# Patient Record
Sex: MALE | Race: BLACK OR AFRICAN AMERICAN | NOT HISPANIC OR LATINO | URBAN - METROPOLITAN AREA
[De-identification: names, ages, dates, MRNs, and addresses within clinical notes are randomized per-mention and may not be internally consistent; named-entity substitution may affect disease eponyms.]

---

## 2018-12-12 LAB — CRC RECOMMENDATION EXT: NORMAL

## 2019-01-21 ENCOUNTER — HISTORICAL (OUTPATIENT)
Dept: CARDIOLOGY | Facility: HOSPITAL | Age: 69
End: 2019-01-21

## 2022-06-08 PROBLEM — D75.9: Status: ACTIVE | Noted: 2022-06-08

## 2022-06-08 NOTE — PROGRESS NOTES
Subjective:       Patient ID: Reg Connell Jr. is a 71 y.o. male.    Chief Complaint: cytopenia (6 mo f/u, Labs, no concerns)      History of Present Illness  Referring: Dr. Garcia (now retired)  PCP: Dr. Jacobs  Vascular: Dr. Rivas  Nephrologist: Dr. Cazares  GI: Dr. Jewell    Problem List: Macrocytic Anemia     Current Treatment: B12 and Folic acid supplements (initiated 2016)    HPI/Clinical History: 71 year old male with mild persistent anemia. Dr. Garcia did a rather complete work up that revealed just a mild macrocytic anemia.   Differential includes b 12 or folate deficiency or very early bone marrow problem.  Bone Marrow Biopsy 11/2/2017- Mildly hypercellular marrow (30-40%) with erythroid hyperplasia and dyserythropoiesis. Normal flow and normal cytogenetics. Increased Iron stores.    Interval History   Today 6/9/22: Patient here today for follow-up visit. He feels well overall and is without complaints today. He is taking Vitamin B12 and folic acid supplements as directed per PCP. He denies any abnormal bleeding or bruising, melena, SOB, CP, early satiety, fevers, or recent illnesses. Labs reviewed and stable overall. No complaints or concerns reported.    Labs:  6/13/2017: WBC 9, Hgb 11.5, Plt 305K, , Hgb electrophoresis, Iron 136, ferritin 376, erythropoietin 23, retic ct 3, haptoglobin 195, SPEP: normal  10/17/17: WBC 7.9, Hgb 10.8, Plt 192K, MCV 99, B12 1403, Folate >20.0, TSH WNL, Creatinine 1.78, GFR 38  11/29/17 Testosterone level 463, Erythropoietin 23.3. WBC 8.0 HGB 12.1, MCV 99, PLT 289K  5/25/17 WBC 8.1 HGB 11.7, HCT 35.3, PLT 211K Cr 1.34  11/30/18 WBC 9.1 HGB 11.4, HCT 34.9, , Folate >20  5/31/19 WBC 8.7, Hgb 11.2, Hct 33.3, MCV 99, MCH 33, Plt 202K, Cr 1.55  2/14/20 WBC 7.6, Hgb 10.7, MCV 99, , Cr 1.60 AlkPhos 124, M spike not observed, QUIRINO normal, SLFC ratio 1.15, kappa 32, lambda 28, T4 1.31, testosterone 403, TSH 1.13, EPO 18, , ESR 24, copper 114, ferritin  288, retic 2.9%, CRP 4. Myeloid panel for MDS negative  20 WBC 8.5 Hg 10.9  Cr 2.18 Retic 75k  20 CR 1.80, TP 6.8, ALB 4.3, alk phos 118, WBC 8.0, Hg 10.5, , ANC 5.7, Retic 11K, Vit B12 875, cryoglobulin negative  21 Cr 1.73 WBC 7.9 RBC 3.07 Hg 10.2  RDW 12.6  ANC 5.4 Folate >20 B12 1354 Retic abs 110k Alb 4.4 TP 6.7 Ca 9.6 AlkPhos 99 AST 19 ALT 23  21 Cr 1.77, , Ferritin 172, Retic 3.1, WBC 8.1, hgb 10.4, , RDW 12.4  22 WBC 8.8, Hgb 10.3, , RDW 12.5, , ANC  6.5, , Retic 2.7%    Imagin/3/2014: Colonoscopy (Dr. Garcia) -polyps at 60 cm and rectum (3mm). Pathology: tubular adenoma  3/2018: Carotid US- 50% blockage---> started on ASA    Bone Marrow Biopsy  2017: Mildly hypercellular marrow (30-40%) with erythroid hyperplasia and dyserythropoiesis. Normal flow and normal cytogenetics. Increased Iron stores.    PMHx: macrocytic anemia, DM, HTN, CKD, B12 and folate deficiency, CAD s/p PCI  SocialHx: Past ETOH use, former smoker, no illicit drug use, benzene and petroleum products, hydrocarbon  FamilyHx: Father- cancer (unknown origin); Mother- heart failure  Allergies: NKDA        Review of Systems  CONSTITUTIONAL: no fevers, no chills, no weight loss, no fatigue, no weakness  HEMATOLOGIC: no abnormal bleeding, no abnormal bruising, no drenching night sweats  ONCOLOGIC: no new masses or lumps  HEENT: no vision loss, no tinnitus or hearing loss, no nose bleeding, no dysphagia, no odynophagia  CVS: no chest pain, no palpitations, no dyspnea on exertion  RESP: no shortness of breath, no hemoptysis, no cough  GI: no nausea, no vomiting, no diarrhea, no constipation, no melena, no hematochezia, no hematemesis, no abdominal pain, no increase in abdominal girth  : no dysuria, no hematuria, no hesitancy, no scrotal swelling, no discharge  INTEGUMENT: no rashes, no abnormal bruising, no nail pitting, no hyperpigmentation  NEURO:  no falls, no memory loss, no paresthesias or dysesthesias, no incontinence or retention, no loss of strength to extremities  MSK: no back pain, no new joint pain, no joint swelling  PSYCH: no suicidal or homicidal ideation, no depression, no insomnia, no anhedonia  ENDOCRINE: no heat or cold intolerance, no polyuria, no polydipsia      Objective:      Physical Exam  Vitals:    06/09/22 1431   BP: (!) 159/61   Pulse: 63   Resp: 18   Temp: 97.6 °F (36.4 °C)        GA: AAOx3, NAD, pleasant  male  HEENT: NCAT, PERRLA, EOMI, no oral ulcers  LYMPH: no cervical, axillary or supraclavicular adenopathy  CVS: s1s2 RRR, murmur, no R/G  RESP: CTA b/l, no crackles, no wheezes or rhonchi  ABD: soft, NT, ND, BS+, no HSM  EXT: no deformities, no pedal edema  SKIN: no rashes, no bruises or purpura, warm and dry  NEURO: normal mentation, strength 5/5 on all 4 extremities, no sensory deficits    Assessment:       Problem List Items Addressed This Visit        Oncology    Idiopathic cytopenia of undetermined significance - Primary      Other Visit Diagnoses     Anemia of chronic renal failure, unspecified CKD stage                Plan:     1. 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 His anemia is not due to renal disease as his EPO level has been high or high normal in the past. 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 Technically with dysplasia and cytopenia diagnosis could be made for MDS but I will not base this on a 4 year bone marrow biopsy  No clonality shown with peripheral blood NGS  At this time, we will hold off bone marrow biopsy and will monitor closely. If any worsening of his counts, then will proceed with repeat BM biopsy. His MCV has been slowly trending up and Hg slowly trending down, counts stable from prior on most recent blood  work. Will continue to monitor      2. Anemia due to chronic kidney disease N18.9   Chronic kidney disease does not cause macrocytosis. It is unlikely that his renal disease is the cause of his anemia as his EPO level is high  Repeat EPO level, testosterone, TSH, FT4, copper, LDH, SPEP, SFLC, ESR, CRP all normal  Can continue B12 and Folic acid supplements for now though his levels have been consistently normal  Intelligen Myeloid panel results negative, cryoglobulin negative  RTC in 6 months with CBC, retic, B12, folate LDH, and ferritin  Call if any questions or concerns           Kanika Cagle, MELYP-C

## 2022-06-09 ENCOUNTER — OFFICE VISIT (OUTPATIENT)
Dept: HEMATOLOGY/ONCOLOGY | Facility: CLINIC | Age: 72
End: 2022-06-09
Payer: MEDICARE

## 2022-06-09 VITALS
BODY MASS INDEX: 33.46 KG/M2 | RESPIRATION RATE: 18 BRPM | TEMPERATURE: 98 F | HEIGHT: 66 IN | OXYGEN SATURATION: 97 % | HEART RATE: 63 BPM | DIASTOLIC BLOOD PRESSURE: 61 MMHG | WEIGHT: 208.19 LBS | SYSTOLIC BLOOD PRESSURE: 159 MMHG

## 2022-06-09 DIAGNOSIS — N18.9 ANEMIA OF CHRONIC RENAL FAILURE, UNSPECIFIED CKD STAGE: ICD-10-CM

## 2022-06-09 DIAGNOSIS — D75.9 IDIOPATHIC CYTOPENIA OF UNDETERMINED SIGNIFICANCE: Primary | ICD-10-CM

## 2022-06-09 DIAGNOSIS — D63.1 ANEMIA OF CHRONIC RENAL FAILURE, UNSPECIFIED CKD STAGE: ICD-10-CM

## 2022-06-09 PROCEDURE — 99213 OFFICE O/P EST LOW 20 MIN: CPT | Mod: ,,, | Performed by: NURSE PRACTITIONER

## 2022-06-09 PROCEDURE — 99213 PR OFFICE/OUTPT VISIT, EST, LEVL III, 20-29 MIN: ICD-10-PCS | Mod: ,,, | Performed by: NURSE PRACTITIONER

## 2022-06-09 RX ORDER — FINASTERIDE 5 MG/1
5 TABLET, FILM COATED ORAL DAILY
COMMUNITY

## 2022-06-09 RX ORDER — DOXAZOSIN 4 MG/1
4 TABLET ORAL DAILY
COMMUNITY
Start: 2022-02-18

## 2022-06-09 RX ORDER — CLOPIDOGREL BISULFATE 75 MG/1
75 TABLET ORAL DAILY
COMMUNITY

## 2022-06-09 RX ORDER — FOLIC ACID 0.4 MG
400 TABLET ORAL
COMMUNITY

## 2022-06-09 RX ORDER — LATANOPROST 50 UG/ML
1 SOLUTION/ DROPS OPHTHALMIC
COMMUNITY
End: 2024-03-15

## 2022-06-09 RX ORDER — EZETIMIBE 10 MG/1
10 TABLET ORAL DAILY
COMMUNITY

## 2022-06-09 RX ORDER — NAPROXEN SODIUM 220 MG/1
81 TABLET, FILM COATED ORAL DAILY
COMMUNITY

## 2022-06-09 RX ORDER — CARVEDILOL 6.25 MG/1
6.25 TABLET ORAL DAILY
COMMUNITY

## 2022-06-09 RX ORDER — AMLODIPINE BESYLATE 5 MG/1
5 TABLET ORAL DAILY
COMMUNITY

## 2022-06-09 RX ORDER — ATORVASTATIN CALCIUM 40 MG/1
40 TABLET, FILM COATED ORAL NIGHTLY
COMMUNITY

## 2022-06-09 RX ORDER — GLIPIZIDE 5 MG/1
5 TABLET ORAL DAILY
COMMUNITY

## 2022-06-09 RX ORDER — LOSARTAN POTASSIUM 100 MG/1
100 TABLET ORAL
COMMUNITY

## 2022-06-09 RX ORDER — LANOLIN ALCOHOL/MO/W.PET/CERES
400 CREAM (GRAM) TOPICAL
COMMUNITY

## 2022-06-09 RX ORDER — METFORMIN HYDROCHLORIDE 500 MG/1
500 TABLET ORAL
COMMUNITY

## 2022-06-09 RX ORDER — ALLOPURINOL 100 MG/1
100 TABLET ORAL
COMMUNITY
Start: 2022-06-02 | End: 2023-06-02

## 2022-06-09 RX ORDER — ACETAMINOPHEN 160 MG/5ML
200 SUSPENSION, ORAL (FINAL DOSE FORM) ORAL
COMMUNITY

## 2022-12-09 ENCOUNTER — OFFICE VISIT (OUTPATIENT)
Dept: HEMATOLOGY/ONCOLOGY | Facility: CLINIC | Age: 72
End: 2022-12-09
Payer: MEDICARE

## 2022-12-09 VITALS
DIASTOLIC BLOOD PRESSURE: 55 MMHG | HEART RATE: 55 BPM | SYSTOLIC BLOOD PRESSURE: 148 MMHG | WEIGHT: 204 LBS | TEMPERATURE: 98 F | HEIGHT: 66 IN | OXYGEN SATURATION: 99 % | BODY MASS INDEX: 32.78 KG/M2 | RESPIRATION RATE: 20 BRPM

## 2022-12-09 DIAGNOSIS — N18.9 ANEMIA OF CHRONIC RENAL FAILURE, UNSPECIFIED CKD STAGE: Primary | ICD-10-CM

## 2022-12-09 DIAGNOSIS — D63.1 ANEMIA OF CHRONIC RENAL FAILURE, UNSPECIFIED CKD STAGE: Primary | ICD-10-CM

## 2022-12-09 PROCEDURE — 99215 OFFICE O/P EST HI 40 MIN: CPT | Mod: ,,,

## 2022-12-09 PROCEDURE — 99215 PR OFFICE/OUTPT VISIT, EST, LEVL V, 40-54 MIN: ICD-10-PCS | Mod: ,,,

## 2022-12-09 RX ORDER — DAPAGLIFLOZIN 5 MG/1
5 TABLET, FILM COATED ORAL DAILY
COMMUNITY

## 2022-12-09 RX ORDER — PREDNISOLONE SODIUM PHOSPHATE 10 MG/ML
1 SOLUTION/ DROPS OPHTHALMIC
COMMUNITY
End: 2024-03-15

## 2022-12-09 NOTE — PROGRESS NOTES
Subjective:       Patient ID: Reg Connell Jr. is a 72 y.o. male.    Chief Complaint: Follow-up      History of Present Illness  Referring: Dr. Garcia (now retired)  PCP: Dr. Jacobs  Vascular: Dr. Rivas  Nephrologist: Dr. Cazares  GI: Dr. Jewell    Problem List: Macrocytic Anemia     Current Treatment: B12 and Folic acid supplements (initiated 2016)    HPI/Clinical History: 71 year old male with mild persistent anemia. Dr. Garcia did a rather complete work up that revealed just a mild macrocytic anemia.   Differential includes b 12 or folate deficiency or very early bone marrow problem.  Bone Marrow Biopsy 11/2/2017- Mildly hypercellular marrow (30-40%) with erythroid hyperplasia and dyserythropoiesis. Normal flow and normal cytogenetics. Increased Iron stores.    Interval History   Today 12/9/22: Patient here today for follow-up visit. He feels well overall and is without complaints today. He is taking Vitamin B12 and folic acid supplements as directed per PCP. He denies any abnormal bleeding or bruising, melena, SOB, CP, early satiety, fevers, or recent illnesses. Labs reviewed and stable overall. No complaints or concerns reported. He has q 4 month follow-up visits with Dr. Cazares. He is having a biopsy of his prostate in the next few weeks. He reports PSA has been slightly elevated for some time. Recent MRI to evaluate prostate complete 11/4/2022    Labs:  6/13/2017: WBC 9, Hgb 11.5, Plt 305K, , Hgb electrophoresis, Iron 136, ferritin 376, erythropoietin 23, retic ct 3, haptoglobin 195, SPEP: normal  10/17/17: WBC 7.9, Hgb 10.8, Plt 192K, MCV 99, B12 1403, Folate >20.0, TSH WNL, Creatinine 1.78, GFR 38  11/29/17 Testosterone level 463, Erythropoietin 23.3. WBC 8.0 HGB 12.1, MCV 99, PLT 289K  5/25/17 WBC 8.1 HGB 11.7, HCT 35.3, PLT 211K Cr 1.34  11/30/18 WBC 9.1 HGB 11.4, HCT 34.9, , Folate >20  5/31/19 WBC 8.7, Hgb 11.2, Hct 33.3, MCV 99, MCH 33, Plt 202K, Cr 1.55  2/14/20 WBC 7.6, Hgb 10.7,  MCV 99, , Cr 1.60 AlkPhos 124, M spike not observed, QUIRINO normal, SLFC ratio 1.15, kappa 32, lambda 28, T4 1.31, testosterone 403, TSH 1.13, EPO 18, , ESR 24, copper 114, ferritin 288, retic 2.9%, CRP 4. Myeloid panel for MDS negative  20 WBC 8.5 Hg 10.9  Cr 2.18 Retic 75k  20 CR 1.80, TP 6.8, ALB 4.3, alk phos 118, WBC 8.0, Hg 10.5, , ANC 5.7, Retic 11K, Vit B12 875, cryoglobulin negative  21 Cr 1.73 WBC 7.9 RBC 3.07 Hg 10.2  RDW 12.6  ANC 5.4 Folate >20 B12 1354 Retic abs 110k Alb 4.4 TP 6.7 Ca 9.6 AlkPhos 99 AST 19 ALT 23  21 Cr 1.77, , Ferritin 172, Retic 3.1, WBC 8.1, hgb 10.4, , RDW 12.4  22 WBC 8.8, Hgb 10.3, , RDW 12.5, , ANC  6.5, , Retic 2.7%  2022 wbc 8.9 hgb 10.5, plt 186,  , B12 1197, folate >20, , Ferritin 120, Retic 3.2    Imagin/3/2014: Colonoscopy (Dr. Garcia) -polyps at 60 cm and rectum (3mm). Pathology: tubular adenoma  3/2018: Carotid US- 50% blockage---> started on ASA    Bone Marrow Biopsy  2017: Mildly hypercellular marrow (30-40%) with erythroid hyperplasia and dyserythropoiesis. Normal flow and normal cytogenetics. Increased Iron stores.    PMHx: macrocytic anemia, DM, HTN, CKD, B12 and folate deficiency, CAD s/p PCI  SocialHx: Past ETOH use, former smoker, no illicit drug use, benzene and petroleum products, hydrocarbon  FamilyHx: Father- cancer (unknown origin); Mother- heart failure  Allergies: NKDA        Review of Systems  CONSTITUTIONAL: no fevers, no chills, no weight loss, no fatigue, no weakness  HEMATOLOGIC: no abnormal bleeding, no abnormal bruising, no drenching night sweats  ONCOLOGIC: no new masses or lumps  HEENT: no vision loss, no tinnitus or hearing loss, no nose bleeding, no dysphagia, no odynophagia  CVS: no chest pain, no palpitations, no dyspnea on exertion  RESP: no shortness of breath, no hemoptysis, no cough  GI: no nausea, no vomiting,  no diarrhea, no constipation, no melena, no hematochezia, no hematemesis, no abdominal pain, no increase in abdominal girth  : no dysuria, no hematuria, no hesitancy, no scrotal swelling, no discharge  INTEGUMENT: no rashes, no abnormal bruising, no nail pitting, no hyperpigmentation  NEURO: no falls, no memory loss, no paresthesias or dysesthesias, no incontinence or retention, no loss of strength to extremities  MSK: no back pain, no new joint pain, no joint swelling  PSYCH: no suicidal or homicidal ideation, no depression, no insomnia, no anhedonia  ENDOCRINE: no heat or cold intolerance, no polyuria, no polydipsia      Objective:      Physical Exam  Vitals:    12/09/22 0938   BP: (!) 148/55   Pulse: (!) 55   Resp: 20   Temp: 97.9 °F (36.6 °C)        GA: AAOx3, NAD, pleasant  male  HEENT: NCAT, PERRLA, EOMI, no oral ulcers  LYMPH: no cervical, axillary or supraclavicular adenopathy  CVS: s1s2 RRR, murmur, no R/G  RESP: CTA b/l, no crackles, no wheezes or rhonchi  ABD: soft, NT, ND, BS+, no HSM  EXT: no deformities, no pedal edema  SKIN: no rashes, no bruises or purpura, warm and dry  NEURO: normal mentation, strength 5/5 on all 4 extremities, no sensory deficits    Assessment:       Problem List Items Addressed This Visit    None        Plan:     1. 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 His anemia is not due to renal disease as his EPO level has been high or high normal in the past. 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 Technically with dysplasia and cytopenia diagnosis could be made for MDS but I will not base this on a 4 year bone marrow biopsy  No clonality shown with peripheral blood NGS  At this time, we will hold off bone marrow biopsy and will monitor closely. If any worsening of his counts, then will proceed  with repeat BM biopsy. His MCV has been slowly trending up and Hg slowly trending down, counts stable from prior on most recent blood work. Will continue to monitor      2. Anemia due to chronic kidney disease N18.9   Chronic kidney disease does not cause macrocytosis. It is unlikely that his renal disease is the cause of his anemia as his EPO level is high  Repeat EPO level, testosterone, TSH, FT4, copper, LDH, SPEP, SFLC, ESR, CRP all normal  Can continue B12 and Folic acid supplements for now though his levels have been consistently normal  Intelligen Myeloid panel results negative, cryoglobulin negative      RTC in 6 months with CBC, retic, B12, folate LDH, and ferritin  MRI results requested  Call if any questions or concerns           AMIE Hernandes-C

## 2022-12-16 ENCOUNTER — DOCUMENTATION ONLY (OUTPATIENT)
Dept: INTERNAL MEDICINE | Facility: CLINIC | Age: 72
End: 2022-12-16
Payer: MEDICARE

## 2023-04-01 ENCOUNTER — PATIENT MESSAGE (OUTPATIENT)
Dept: RESEARCH | Facility: HOSPITAL | Age: 73
End: 2023-04-01
Payer: MEDICARE

## 2023-04-04 ENCOUNTER — PATIENT MESSAGE (OUTPATIENT)
Dept: RESEARCH | Facility: HOSPITAL | Age: 73
End: 2023-04-04
Payer: MEDICARE

## 2023-04-25 ENCOUNTER — PATIENT MESSAGE (OUTPATIENT)
Dept: RESEARCH | Facility: HOSPITAL | Age: 73
End: 2023-04-25
Payer: MEDICARE

## 2023-05-02 ENCOUNTER — PATIENT MESSAGE (OUTPATIENT)
Dept: RESEARCH | Facility: HOSPITAL | Age: 73
End: 2023-05-02
Payer: MEDICARE

## 2023-05-16 ENCOUNTER — PATIENT MESSAGE (OUTPATIENT)
Dept: RESEARCH | Facility: HOSPITAL | Age: 73
End: 2023-05-16
Payer: MEDICARE

## 2023-06-09 ENCOUNTER — OFFICE VISIT (OUTPATIENT)
Dept: HEMATOLOGY/ONCOLOGY | Facility: CLINIC | Age: 73
End: 2023-06-09
Payer: MEDICARE

## 2023-06-09 VITALS
WEIGHT: 201.88 LBS | TEMPERATURE: 99 F | SYSTOLIC BLOOD PRESSURE: 126 MMHG | DIASTOLIC BLOOD PRESSURE: 56 MMHG | RESPIRATION RATE: 18 BRPM | OXYGEN SATURATION: 95 % | HEART RATE: 50 BPM | HEIGHT: 66 IN | BODY MASS INDEX: 32.44 KG/M2

## 2023-06-09 DIAGNOSIS — C61 PROSTATE CANCER: ICD-10-CM

## 2023-06-09 DIAGNOSIS — D53.9 MACROCYTIC ANEMIA: ICD-10-CM

## 2023-06-09 PROCEDURE — 99214 OFFICE O/P EST MOD 30 MIN: CPT | Mod: ,,, | Performed by: STUDENT IN AN ORGANIZED HEALTH CARE EDUCATION/TRAINING PROGRAM

## 2023-06-09 PROCEDURE — 99214 PR OFFICE/OUTPT VISIT, EST, LEVL IV, 30-39 MIN: ICD-10-PCS | Mod: ,,, | Performed by: STUDENT IN AN ORGANIZED HEALTH CARE EDUCATION/TRAINING PROGRAM

## 2023-06-09 RX ORDER — CHOLECALCIFEROL (VITAMIN D3) 25 MCG
1000 TABLET ORAL DAILY
COMMUNITY

## 2023-06-09 RX ORDER — ALLOPURINOL 100 MG/1
1 TABLET ORAL DAILY
COMMUNITY
Start: 2023-01-18

## 2023-06-09 NOTE — PROGRESS NOTES
Subjective:       Patient ID: Reg Connell Jr. is a 72 y.o. male.    Chief Complaint: Follow-up        History of Present Illness  Referring: Dr. Garcia (now retired)  PCP: Dr. Jacobs  Vascular: Dr. Rivas  Nephrologist: Dr. Cazares  GI: Dr. Jewell    Problem List: Macrocytic Anemia     Current Treatment: B12 and Folic acid supplements (initiated 2016)    HPI/Clinical History: 71 year old male with mild persistent anemia. Dr. Garcia did a rather complete work up that revealed just a mild macrocytic anemia.   Differential includes b 12 or folate deficiency or very early bone marrow problem.  Bone Marrow Biopsy 11/2/2017- Mildly hypercellular marrow (30-40%) with erythroid hyperplasia and dyserythropoiesis. Normal flow and normal cytogenetics. Increased Iron stores.    Interval History   Today , 06/09/2023, patient denies any acute concerns today.  He reports starting treatment for prostate cancer with Dr. Do's office and is scheduled for radiation therapy with Dr. Porras near future.  He denies any blood in his urine, blood in his stools, symptoms of fatigue, unintentional weight loss, decreased appetite, fevers or chills.    Labs:  6/13/2017: WBC 9, Hgb 11.5, Plt 305K, , Hgb electrophoresis, Iron 136, ferritin 376, erythropoietin 23, retic ct 3, haptoglobin 195, SPEP: normal  10/17/17: WBC 7.9, Hgb 10.8, Plt 192K, MCV 99, B12 1403, Folate >20.0, TSH WNL, Creatinine 1.78, GFR 38  11/29/17 Testosterone level 463, Erythropoietin 23.3. WBC 8.0 HGB 12.1, MCV 99, PLT 289K  5/25/17 WBC 8.1 HGB 11.7, HCT 35.3, PLT 211K Cr 1.34  11/30/18 WBC 9.1 HGB 11.4, HCT 34.9, , Folate >20  5/31/19 WBC 8.7, Hgb 11.2, Hct 33.3, MCV 99, MCH 33, Plt 202K, Cr 1.55  2/14/20 WBC 7.6, Hgb 10.7, MCV 99, , Cr 1.60 AlkPhos 124, M spike not observed, QUIRINO normal, SLFC ratio 1.15, kappa 32, lambda 28, T4 1.31, testosterone 403, TSH 1.13, EPO 18, , ESR 24, copper 114, ferritin 288, retic 2.9%, CRP 4. Myeloid  panel for MDS negative  20 WBC 8.5 Hg 10.9  Cr 2.18 Retic 75k  20 CR 1.80, TP 6.8, ALB 4.3, alk phos 118, WBC 8.0, Hg 10.5, , ANC 5.7, Retic 11K, Vit B12 875, cryoglobulin negative  21 Cr 1.73 WBC 7.9 RBC 3.07 Hg 10.2  RDW 12.6  ANC 5.4 Folate >20 B12 1354 Retic abs 110k Alb 4.4 TP 6.7 Ca 9.6 AlkPhos 99 AST 19 ALT 23  21 Cr 1.77, , Ferritin 172, Retic 3.1, WBC 8.1, hgb 10.4, , RDW 12.4  22 WBC 8.8, Hgb 10.3, , RDW 12.5, , ANC  6.5, , Retic 2.7%  2022 wbc 8.9 hgb 10.5, plt 186,  , B12 1197, folate >20, , Ferritin 120, Retic 3.2  2023:  WBC count 9.3, hemoglobin 11, , platelet count 192, ANC 6.8, creatinine 1.86, vitamin B12 1271, folic acid more than 20,  Imagin/3/2014: Colonoscopy (Dr. Garcia) -polyps at 60 cm and rectum (3mm). Pathology: tubular adenoma  3/2018: Carotid US- 50% blockage---> started on ASA    2022 MRI prostate:  PI-RADS 5, very high, high volume right-sided transitional zone disease.  Given this lesion size, routine in office TIS biopsies recommended.    Pathology    Bone Marrow Biopsy  2017: Mildly hypercellular marrow (30-40%) with erythroid hyperplasia and dyserythropoiesis. Normal flow and normal cytogenetics. Increased Iron stores.    PMHx: macrocytic anemia, DM, HTN, CKD, B12 and folate deficiency, CAD s/p PCI  SocialHx: Past ETOH use, former smoker, no illicit drug use, benzene and petroleum products, hydrocarbon  FamilyHx: Father- cancer (unknown origin); Mother- heart failure  Allergies: NKDA        Review of Systems  CONSTITUTIONAL: no fevers, no chills, no weight loss, no fatigue, no weakness  HEMATOLOGIC: no abnormal bleeding, no abnormal bruising, no drenching night sweats  ONCOLOGIC: no new masses or lumps  HEENT: no vision loss, no tinnitus or hearing loss, no nose bleeding, no dysphagia, no odynophagia  CVS: no chest pain, no palpitations, no  dyspnea on exertion  RESP: no shortness of breath, no hemoptysis, no cough  GI: no nausea, no vomiting, no diarrhea, no constipation, no melena, no hematochezia, no hematemesis, no abdominal pain, no increase in abdominal girth  : no dysuria, no hematuria, no hesitancy, no scrotal swelling, no discharge  INTEGUMENT: no rashes, no abnormal bruising, no nail pitting, no hyperpigmentation  NEURO: no falls, no memory loss, no paresthesias or dysesthesias, no incontinence or retention, no loss of strength to extremities  MSK: no back pain, no new joint pain, no joint swelling  PSYCH: no suicidal or homicidal ideation, no depression, no insomnia, no anhedonia  ENDOCRINE: no heat or cold intolerance, no polyuria, no polydipsia      Objective:      Physical Exam  Vitals:    06/09/23 0958   BP: (!) 126/56   Pulse: (!) 50   Resp: 18   Temp: 98.5 °F (36.9 °C)        GA: AAOx3, NAD, pleasant  male  HEENT: NCAT, PERRLA, EOMI, no oral ulcers  LYMPH: no cervical, axillary or supraclavicular adenopathy  CVS: s1s2 RRR, murmur, no R/G  RESP: CTA b/l, no crackles, no wheezes or rhonchi  ABD: soft, NT, ND, BS+, no HSM  EXT: no deformities, no pedal edema  SKIN: no rashes, no bruises or purpura, warm and dry  NEURO: normal mentation, strength 5/5 on all 4 extremities, no sensory deficits    Assessment:       Problem List Items Addressed This Visit    None          Plan:         # Prostate cancer, stage indeterminate   noted MRI prostate in in November 2023 concerning for prostate cancer   Patient reports undergoing biopsies with Dr. Do's office and is planning to start ADT and radiation therapy in the near future   Will obtain records from Radiation Oncology and Neurology      # Macrocytic anemia   ICUS in red cell lineage. 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 His anemia is not due to renal disease as his EPO level has been high or high normal in  the past. 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 Technically with dysplasia and cytopenia diagnosis could be made for MDS No clonality shown with peripheral blood NGS  At this time, we will hold off bone marrow biopsy and will monitor closely. If any worsening of his counts, then will proceed with repeat BM biopsy. His MCV has been slowly trending up and Hg slowly trending down, counts stable from prior on most recent blood work. Will continue to monitor      # Anemia due to chronic kidney disease N18.9   Chronic kidney disease does not cause macrocytosis. It is unlikely that his renal disease is the cause of his anemia as his EPO level is high  Repeat EPO level, testosterone, TSH, FT4, copper, LDH, SPEP, SFLC, ESR, CRP all normal  Can continue B12 and Folic acid supplements for now though his levels have been consistently normal  Intelligen Myeloid panel results negative, cryoglobulin negative        Plan   Obtain records from Radiation Oncology and Urology  RTC in 4 months with CBC, retic, B12, folate LDH, and ferritin.    Patient will be undergoing ADT with Urology and likely will be undergoing PSA testing with them as well      A total of  30 minutes were spent in review of records, interpretation of test, coordination of care, discussion and counseling with the patient.        Portions of the record may have been created with voice recognition software. Occasional wrong-word or sound-a-like substitutions may have occurred due to the inherent limitations of voice recognition software. Read the chart carefully and recognize, using context, where substitutions have occurred.

## 2023-12-15 ENCOUNTER — OFFICE VISIT (OUTPATIENT)
Dept: HEMATOLOGY/ONCOLOGY | Facility: CLINIC | Age: 73
End: 2023-12-15
Payer: MEDICARE

## 2023-12-15 VITALS
BODY MASS INDEX: 33.69 KG/M2 | WEIGHT: 209.63 LBS | OXYGEN SATURATION: 96 % | HEART RATE: 67 BPM | DIASTOLIC BLOOD PRESSURE: 63 MMHG | HEIGHT: 66 IN | TEMPERATURE: 98 F | RESPIRATION RATE: 20 BRPM | SYSTOLIC BLOOD PRESSURE: 136 MMHG

## 2023-12-15 DIAGNOSIS — N18.9 ANEMIA OF CHRONIC RENAL FAILURE, UNSPECIFIED CKD STAGE: Primary | ICD-10-CM

## 2023-12-15 DIAGNOSIS — D63.1 ANEMIA OF CHRONIC RENAL FAILURE, UNSPECIFIED CKD STAGE: Primary | ICD-10-CM

## 2023-12-15 DIAGNOSIS — C61 PROSTATE CANCER: ICD-10-CM

## 2023-12-15 PROCEDURE — 99214 PR OFFICE/OUTPT VISIT, EST, LEVL IV, 30-39 MIN: ICD-10-PCS | Mod: ,,,

## 2023-12-15 PROCEDURE — 99214 OFFICE O/P EST MOD 30 MIN: CPT | Mod: ,,,

## 2023-12-15 RX ORDER — TAMSULOSIN HYDROCHLORIDE 0.4 MG/1
1 CAPSULE ORAL 2 TIMES DAILY
COMMUNITY

## 2023-12-15 NOTE — PROGRESS NOTES
Subjective:       Patient ID: Reg Connell Jr. is a 73 y.o. male.    Chief Complaint: Results        History of Present Illness  Referring: Dr. Garcia (now retired)  PCP: Dr. Jacobs  Vascular: Dr. Rivas  Nephrologist: Dr. Cazares  GI: Dr. Jewell    Problem List: Macrocytic Anemia     Current Treatment: B12 and Folic acid supplements (initiated 2016)    HPI/Clinical History: 71 year old male with mild persistent anemia. Dr. Garcia did a rather complete work up that revealed just a mild macrocytic anemia.   Differential includes b 12 or folate deficiency or very early bone marrow problem.  Bone Marrow Biopsy 11/2/2017- Mildly hypercellular marrow (30-40%) with erythroid hyperplasia and dyserythropoiesis. Normal flow and normal cytogenetics. Increased Iron stores.    Interval History   Today , 12/15//2023, patient denies any acute concerns today.  He has completed XRT and ADT for prostate cancer with Dr. Do's office. Next f/u with Dr. Do is 5/2024. He will follow-up with  Dr. Vern villarreal. He feels well to today despite worsening anemia. He remains active and denies weakness, dizziness, or sob.  He denies any blood in his urine, blood in his stools, symptoms of fatigue, unintentional weight loss, decreased appetite, fevers or chills. He has regular follow-up with nephrology. Patient is s/p right carotid endarterectomy.      Labs:  6/13/2017: WBC 9, Hgb 11.5, Plt 305K, , Hgb electrophoresis, Iron 136, ferritin 376, erythropoietin 23, retic ct 3, haptoglobin 195, SPEP: normal  10/17/17: WBC 7.9, Hgb 10.8, Plt 192K, MCV 99, B12 1403, Folate >20.0, TSH WNL, Creatinine 1.78, GFR 38  11/29/17 Testosterone level 463, Erythropoietin 23.3. WBC 8.0 HGB 12.1, MCV 99, PLT 289K  5/25/17 WBC 8.1 HGB 11.7, HCT 35.3, PLT 211K Cr 1.34  11/30/18 WBC 9.1 HGB 11.4, HCT 34.9, , Folate >20  5/31/19 WBC 8.7, Hgb 11.2, Hct 33.3, MCV 99, MCH 33, Plt 202K, Cr 1.55  2/14/20 WBC 7.6, Hgb 10.7, MCV 99, , Cr  1.60 AlkPhos 124, M spike not observed, QUIRINO normal, SLFC ratio 1.15, kappa 32, lambda 28, T4 1.31, testosterone 403, TSH 1.13, EPO 18, , ESR 24, copper 114, ferritin 288, retic 2.9%, CRP 4. Myeloid panel for MDS negative  20 WBC 8.5 Hg 10.9  Cr 2.18 Retic 75k  20 CR 1.80, TP 6.8, ALB 4.3, alk phos 118, WBC 8.0, Hg 10.5, , ANC 5.7, Retic 11K, Vit B12 875, cryoglobulin negative  21 Cr 1.73 WBC 7.9 RBC 3.07 Hg 10.2  RDW 12.6  ANC 5.4 Folate >20 B12 1354 Retic abs 110k Alb 4.4 TP 6.7 Ca 9.6 AlkPhos 99 AST 19 ALT 23  21 Cr 1.77, , Ferritin 172, Retic 3.1, WBC 8.1, hgb 10.4, , RDW 12.4  22 WBC 8.8, Hgb 10.3, , RDW 12.5, , ANC  6.5, , Retic 2.7%  2022 wbc 8.9 hgb 10.5, plt 186,  , B12 1197, folate >20, , Ferritin 120, Retic 3.2  2023:  WBC count 9.3, hemoglobin 11, , platelet count 192, ANC 6.8, creatinine 1.86, vitamin B12 1271, folic acid more than 20,  2023: wbc 7.1, hgb 9.4, , plt 160, ANC 5.2, cr 1.56, alb 4.0, ca 9.6, LFTs WNL, folic acid >20, B12 1034, iron 117, ironsat 44%, ferritin 185, retic 4.3  Imagin/3/2014: Colonoscopy (Dr. Garcia) -polyps at 60 cm and rectum (3mm). Pathology: tubular adenoma  3/2018: Carotid US- 50% blockage---> started on ASA    2022 MRI prostate:  PI-RADS 5, very high, high volume right-sided transitional zone disease.  Given this lesion size, routine in office TIS biopsies recommended.      Pathology   23 Peripheral smear: WBC population adequate w/ normal differential. No circulating blasts identified. Moderate macrocytic/ normocytic anemia. Platelet population adequate.   2023 PROSTATE, RIGHT MID, TRANSRECTAL BIOPSY:- PROSTATIC ADENOCARCINOMA, SCORE 3+4 (TONO'S GRADE 7, GRADE GROUP 2).- THE TUMOR IS PRESENT DISCONTINUOUSLY BUT OVERALL MEASURES 6 MM IN LENGTH.    Bone Marrow Biopsy  2017: Mildly hypercellular marrow  (30-40%) with erythroid hyperplasia and dyserythropoiesis. Normal flow and normal cytogenetics. Increased Iron stores. 12/08/23    PMHx: macrocytic anemia, DM, HTN, CKD, B12 and folate deficiency, CAD s/p PCI  SocialHx: Past ETOH use, former smoker, no illicit drug use, benzene and petroleum products, hydrocarbon  FamilyHx: Father- cancer (unknown origin); Mother- heart failure  Allergies: NKDA        Review of Systems  CONSTITUTIONAL: no fevers, no chills, no weight loss, no fatigue, no weakness  HEMATOLOGIC: no abnormal bleeding, no abnormal bruising, no drenching night sweats  ONCOLOGIC: no new masses or lumps  HEENT: no vision loss, no tinnitus or hearing loss, no nose bleeding, no dysphagia, no odynophagia  CVS: no chest pain, no palpitations, no dyspnea on exertion  RESP: no shortness of breath, no hemoptysis, no cough  GI: no nausea, no vomiting, no diarrhea, no constipation, no melena, no hematochezia, no hematemesis, no abdominal pain, no increase in abdominal girth  : no dysuria, no hematuria, no hesitancy, no scrotal swelling, no discharge  INTEGUMENT: no rashes, no abnormal bruising, no nail pitting, no hyperpigmentation  NEURO: no falls, no memory loss, no paresthesias or dysesthesias, no incontinence or retention, no loss of strength to extremities  MSK: no back pain, no new joint pain, no joint swelling  PSYCH: no suicidal or homicidal ideation, no depression, no insomnia, no anhedonia  ENDOCRINE: no heat or cold intolerance, no polyuria, no polydipsia      Objective:      Physical Exam  Vitals:    12/15/23 0950   BP: 136/63   Pulse: 67   Resp: 20   Temp: 97.9 °F (36.6 °C)      GA: AAOx3, NAD, pleasant  male  HEENT: NCAT, PERRLA, EOMI, no oral ulcers  LYMPH: no cervical, axillary or supraclavicular adenopathy  CVS: s1s2 RRR, murmur, no R/G  RESP: CTA b/l, no crackles, no wheezes or rhonchi  ABD: soft, NT, ND, BS+, no HSM  EXT: no deformities, no pedal edema  SKIN: no rashes, no bruises or  purpura, warm and dry  NEURO: normal mentation, strength 5/5 on all 4 extremities, no sensory deficits    Assessment:     # Prostate cancer, stage indeterminate   noted MRI prostate in in November 2023 concerning for prostate cancer   Patient reports undergoing biopsies with Dr. Do's office and is planning to start ADT and radiation therapy in the near future   Will obtain records from Radiation Oncology and Neurology  6/1/2023: Patient is s/p XRT and ADT for treatment of prostate cancer diagnosed via bx on       # Macrocytic anemia   ICUS in red cell lineage. 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 His anemia is not due to renal disease as his EPO level has been high or high normal in the past. 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 Technically with dysplasia and cytopenia diagnosis could be made for MDS No clonality shown with peripheral blood NGS  At this time, we will hold off bone marrow biopsy and will monitor closely. If any worsening of his counts, then will proceed with repeat BM biopsy. His MCV has been slowly trending up and Hg slowly trending down, counts stable from prior on most recent blood work. Will continue to monitor  Noted worsening MCV and Hgb since last visit. Normal B12 and folic acid        # Anemia due to chronic kidney disease N18.9   Chronic kidney disease does not cause macrocytosis. It is unlikely that his renal disease is the cause of his anemia as his EPO level is high  Repeat EPO level, testosterone, TSH, FT4, copper, LDH, SPEP, SFLC, ESR, CRP all normal  Can continue B12 and Folic acid supplements for now though his levels have been consistently normal  Intelligen Myeloid panel results negative, cryoglobulin negative            Plan:   Plan   RTC in 3 months with CBC, retic, B12, folate, TSH  Continue follow-up with Dr. Cazares, Dr. Do as scheduled.         A total of  30  minutes were spent in review of records, interpretation of test, coordination of care, discussion and counseling with the patient.

## 2024-03-14 NOTE — PROGRESS NOTES
Subjective:       Patient ID: Reg Connell Jr. is a 73 y.o. male.    Chief Complaint: Results        History of Present Illness  Referring: Dr. Garcia (now retired)  PCP: Dr. Jacobs  Vascular: Dr. Rivas  Nephrologist: Dr. Cazares  GI: Dr. Jewell    Problem List: Macrocytic Anemia     Current Treatment: B12 and Folic acid supplements (initiated 2016)    HPI/Clinical History: 71 year old male with mild persistent anemia. Dr. Garcia did a rather complete work up that revealed just a mild macrocytic anemia.   Differential includes b 12 or folate deficiency or very early bone marrow problem.  Bone Marrow Biopsy 11/2/2017- Mildly hypercellular marrow (30-40%) with erythroid hyperplasia and dyserythropoiesis. Normal flow and normal cytogenetics. Increased Iron stores.    Interval History   Today , 3/15/24, patient denies any acute concerns today.  He has completed XRT and ADT for prostate cancer with Dr. Do's office. Next f/u with Dr. Do is 5/2024. He will follow-up with  Dr. Vern villarreal. He feels well to today other than intermittent fatigue. He remains active and denies weakness, dizziness, or sob.  He denies any blood in his urine, blood in his stools, symptoms of fatigue, unintentional weight loss, decreased appetite, fevers or chills. He has regular follow-up with nephrology. Patient is s/p right carotid endarterectomy.      Labs:  6/13/2017: WBC 9, Hgb 11.5, Plt 305K, , Hgb electrophoresis, Iron 136, ferritin 376, erythropoietin 23, retic ct 3, haptoglobin 195, SPEP: normal  10/17/17: WBC 7.9, Hgb 10.8, Plt 192K, MCV 99, B12 1403, Folate >20.0, TSH WNL, Creatinine 1.78, GFR 38  11/29/17 Testosterone level 463, Erythropoietin 23.3. WBC 8.0 HGB 12.1, MCV 99, PLT 289K  5/25/17 WBC 8.1 HGB 11.7, HCT 35.3, PLT 211K Cr 1.34  11/30/18 WBC 9.1 HGB 11.4, HCT 34.9, , Folate >20  5/31/19 WBC 8.7, Hgb 11.2, Hct 33.3, MCV 99, MCH 33, Plt 202K, Cr 1.55  2/14/20 WBC 7.6, Hgb 10.7, MCV 99, , Cr  1.60 AlkPhos 124, M spike not observed, QUIRINO normal, SLFC ratio 1.15, kappa 32, lambda 28, T4 1.31, testosterone 403, TSH 1.13, EPO 18, , ESR 24, copper 114, ferritin 288, retic 2.9%, CRP 4. Myeloid panel for MDS negative  20 WBC 8.5 Hg 10.9  Cr 2.18 Retic 75k  20 CR 1.80, TP 6.8, ALB 4.3, alk phos 118, WBC 8.0, Hg 10.5, , ANC 5.7, Retic 11K, Vit B12 875, cryoglobulin negative  21 Cr 1.73 WBC 7.9 RBC 3.07 Hg 10.2  RDW 12.6  ANC 5.4 Folate >20 B12 1354 Retic abs 110k Alb 4.4 TP 6.7 Ca 9.6 AlkPhos 99 AST 19 ALT 23  21 Cr 1.77, , Ferritin 172, Retic 3.1, WBC 8.1, hgb 10.4, , RDW 12.4  22 WBC 8.8, Hgb 10.3, , RDW 12.5, , ANC  6.5, , Retic 2.7%  2022 wbc 8.9 hgb 10.5, plt 186,  , B12 1197, folate >20, , Ferritin 120, Retic 3.2  2023:  WBC count 9.3, hemoglobin 11, , platelet count 192, ANC 6.8, creatinine 1.86, vitamin B12 1271, folic acid more than 20,  2023: wbc 7.1, hgb 9.4, , plt 160, ANC 5.2, cr 1.56, alb 4.0, ca 9.6, LFTs WNL, folic acid >20, B12 1034, iron 117, ironsat 44%, ferritin 185, retic 4.3  3/7/24 : wbc 8.8, hgb 9.5, , plt 173, ANC 7.2, cr 1.67, alb 4.1, ca 9.3, LFTs WNL, folic acid >20, B12 1609, tsh 1.160      Imagin/3/2014: Colonoscopy (Dr. Garcia) -polyps at 60 cm and rectum (3mm). Pathology: tubular adenoma  3/2018: Carotid US- 50% blockage---> started on ASA    2022 MRI prostate:  PI-RADS 5, very high, high volume right-sided transitional zone disease.  Given this lesion size, routine in office TIS biopsies recommended.      Pathology   23 Peripheral smear: WBC population adequate w/ normal differential. No circulating blasts identified. Moderate macrocytic/ normocytic anemia. Platelet population adequate.   2023 PROSTATE, RIGHT MID, TRANSRECTAL BIOPSY:- PROSTATIC ADENOCARCINOMA, SCORE 3+4 (TONO'S GRADE 7, GRADE GROUP 2).- THE  TUMOR IS PRESENT DISCONTINUOUSLY BUT OVERALL MEASURES 6 MM IN LENGTH.    Bone Marrow Biopsy  11/2/2017: Mildly hypercellular marrow (30-40%) with erythroid hyperplasia and dyserythropoiesis. Normal flow and normal cytogenetics. Increased Iron stores. 12/08/23    PMHx: macrocytic anemia, DM, HTN, CKD, B12 and folate deficiency, CAD s/p PCI  SocialHx: Past ETOH use, former smoker, no illicit drug use, benzene and petroleum products, hydrocarbon  FamilyHx: Father- cancer (unknown origin); Mother- heart failure  Allergies: NKDA        Review of Systems  CONSTITUTIONAL: no fevers, no chills, no weight loss, no fatigue, no weakness  HEMATOLOGIC: no abnormal bleeding, no abnormal bruising, no drenching night sweats  ONCOLOGIC: no new masses or lumps  HEENT: no vision loss, no tinnitus or hearing loss, no nose bleeding, no dysphagia, no odynophagia  CVS: no chest pain, no palpitations, no dyspnea on exertion  RESP: no shortness of breath, no hemoptysis, no cough  GI: no nausea, no vomiting, no diarrhea, no constipation, no melena, no hematochezia, no hematemesis, no abdominal pain, no increase in abdominal girth  : no dysuria, no hematuria, no hesitancy, no scrotal swelling, no discharge  INTEGUMENT: no rashes, no abnormal bruising, no nail pitting, no hyperpigmentation  NEURO: no falls, no memory loss, no paresthesias or dysesthesias, no incontinence or retention, no loss of strength to extremities  MSK: no back pain, no new joint pain, no joint swelling  PSYCH: no suicidal or homicidal ideation, no depression, no insomnia, no anhedonia  ENDOCRINE: no heat or cold intolerance, no polyuria, no polydipsia      Objective:      Physical Exam  Vitals:    03/15/24 0929   BP: (!) 138/57   Pulse: 61   Resp: 18   Temp: 98.1 °F (36.7 °C)        GA: AAOx3, NAD, pleasant  male  HEENT: NCAT, PERRLA, EOMI, no oral ulcers  LYMPH: no cervical, axillary or supraclavicular adenopathy  CVS: s1s2 RRR, murmur, no R/G  RESP: CTA b/l,  no crackles, no wheezes or rhonchi  ABD: soft, NT, ND, BS+, no HSM  EXT: no deformities, no pedal edema  SKIN: no rashes, no bruises or purpura, warm and dry  NEURO: normal mentation, strength 5/5 on all 4 extremities, no sensory deficits    Assessment:     # Prostate cancer, stage indeterminate   noted MRI prostate in in November 2023 concerning for prostate cancer   Patient reports undergoing biopsies with Dr. Do's office and is planning to start ADT and radiation therapy in the near future   Will obtain records from [facility]  6/1/2023: Patient is s/p XRT and ADT for treatment of prostate cancer diagnosed via bx on       # Macrocytic anemia   ICUS in red cell lineage. 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 His anemia is not due to renal disease as his EPO level has been high or high normal in the past. 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 Technically with dysplasia and cytopenia diagnosis could be made for MDS No clonality shown with peripheral blood NGS  At this time, we will hold off bone marrow biopsy and will monitor closely. If any worsening of his counts, then will proceed with repeat BM biopsy. His MCV has been slowly trending up and Hg slowly trending down, counts stable from prior on most recent blood work. Will continue to monitor  Noted worsening MCV and Hgb since last visit. Normal B12 and folic acid        # Anemia due to chronic kidney disease N18.9   Chronic kidney disease does not cause macrocytosis. It is unlikely that his renal disease is the cause of his anemia as his EPO level is high  Repeat EPO level, testosterone, TSH, FT4, copper, LDH, SPEP, SFLC, ESR, CRP all normal  Can continue B12 and Folic acid supplements for now though his levels have been consistently normal  Intelligen Myeloid panel results negative, cryoglobulin negative            Plan:   Plan   RTC in  3 months with CBC, retic, B12, folate, TSH  Continue follow-up with Dr. Cazares, Dr. Do as scheduled.         A total of  30 minutes were spent in review of records, interpretation of test, coordination of care, discussion and counseling with the patient.

## 2024-03-15 ENCOUNTER — OFFICE VISIT (OUTPATIENT)
Dept: HEMATOLOGY/ONCOLOGY | Facility: CLINIC | Age: 74
End: 2024-03-15
Payer: MEDICARE

## 2024-03-15 VITALS
HEIGHT: 66 IN | DIASTOLIC BLOOD PRESSURE: 57 MMHG | WEIGHT: 215.5 LBS | OXYGEN SATURATION: 97 % | TEMPERATURE: 98 F | BODY MASS INDEX: 34.63 KG/M2 | RESPIRATION RATE: 18 BRPM | HEART RATE: 61 BPM | SYSTOLIC BLOOD PRESSURE: 138 MMHG

## 2024-03-15 DIAGNOSIS — D53.9 MACROCYTIC ANEMIA: Primary | ICD-10-CM

## 2024-03-15 DIAGNOSIS — D75.9 IDIOPATHIC CYTOPENIA OF UNDETERMINED SIGNIFICANCE: ICD-10-CM

## 2024-03-15 DIAGNOSIS — C61 PROSTATE CANCER: ICD-10-CM

## 2024-03-15 PROCEDURE — 99214 OFFICE O/P EST MOD 30 MIN: CPT | Mod: ,,,

## 2024-06-19 ENCOUNTER — OFFICE VISIT (OUTPATIENT)
Dept: HEMATOLOGY/ONCOLOGY | Facility: CLINIC | Age: 74
End: 2024-06-19
Payer: MEDICARE

## 2024-06-19 VITALS
BODY MASS INDEX: 34.12 KG/M2 | TEMPERATURE: 98 F | HEIGHT: 66 IN | RESPIRATION RATE: 18 BRPM | HEART RATE: 59 BPM | DIASTOLIC BLOOD PRESSURE: 65 MMHG | WEIGHT: 212.31 LBS | SYSTOLIC BLOOD PRESSURE: 130 MMHG | OXYGEN SATURATION: 98 %

## 2024-06-19 DIAGNOSIS — D63.1 ANEMIA OF CHRONIC RENAL FAILURE, UNSPECIFIED CKD STAGE: ICD-10-CM

## 2024-06-19 DIAGNOSIS — N18.9 ANEMIA OF CHRONIC RENAL FAILURE, UNSPECIFIED CKD STAGE: ICD-10-CM

## 2024-06-19 DIAGNOSIS — C61 PROSTATE CANCER: ICD-10-CM

## 2024-06-19 DIAGNOSIS — D53.9 MACROCYTIC ANEMIA: Primary | ICD-10-CM

## 2024-06-19 PROCEDURE — 99214 OFFICE O/P EST MOD 30 MIN: CPT | Mod: ,,,

## 2024-06-19 RX ORDER — TIMOLOL MALEATE 5 MG/ML
1 SOLUTION/ DROPS OPHTHALMIC 2 TIMES DAILY
COMMUNITY
Start: 2024-06-08

## 2024-06-19 NOTE — PROGRESS NOTES
Subjective:       Patient ID: Reg Connell Jr. is a 73 y.o. male.    Chief Complaint: Results        History of Present Illness  Referring: Dr. Garcia (now retired)  PCP: Dr. Jacobs  Vascular: Dr. Rivas  Nephrologist: Dr. Cazares  GI: Dr. Jewell    Problem List: Macrocytic Anemia     Current Treatment: B12 and Folic acid supplements (initiated 2016)    HPI/Clinical History: 71 year old male with mild persistent anemia. Dr. Garcia did a rather complete work up that revealed just a mild macrocytic anemia.   Differential includes b 12 or folate deficiency or very early bone marrow problem.  Bone Marrow Biopsy 11/2/2017- Mildly hypercellular marrow (30-40%) with erythroid hyperplasia and dyserythropoiesis. Normal flow and normal cytogenetics. Increased Iron stores.    Interval History   Today , 06/19/24, patient denies any acute concerns today.  He continues follow-up for prostate cancer with Dr. Do q 6 months. He feels well to today other than intermittent fatigue. He remains active and denies weakness, dizziness, or sob.  He denies any blood in his urine, blood in his stools, symptoms of fatigue, unintentional weight loss, decreased appetite, fevers or chills. He has regular follow-up with nephrology.     Labs:  6/13/2017: WBC 9, Hgb 11.5, Plt 305K, , Hgb electrophoresis, Iron 136, ferritin 376, erythropoietin 23, retic ct 3, haptoglobin 195, SPEP: normal  10/17/17: WBC 7.9, Hgb 10.8, Plt 192K, MCV 99, B12 1403, Folate >20.0, TSH WNL, Creatinine 1.78, GFR 38  11/29/17 Testosterone level 463, Erythropoietin 23.3. WBC 8.0 HGB 12.1, MCV 99, PLT 289K  5/25/17 WBC 8.1 HGB 11.7, HCT 35.3, PLT 211K Cr 1.34  11/30/18 WBC 9.1 HGB 11.4, HCT 34.9, , Folate >20  5/31/19 WBC 8.7, Hgb 11.2, Hct 33.3, MCV 99, MCH 33, Plt 202K, Cr 1.55  2/14/20 WBC 7.6, Hgb 10.7, MCV 99, , Cr 1.60 AlkPhos 124, M spike not observed, QUIRINO normal, SLFC ratio 1.15, kappa 32, lambda 28, T4 1.31, testosterone 403, TSH 1.13,  EPO 18, , ESR 24, copper 114, ferritin 288, retic 2.9%, CRP 4. Myeloid panel for MDS negative  20 WBC 8.5 Hg 10.9  Cr 2.18 Retic 75k  20 CR 1.80, TP 6.8, ALB 4.3, alk phos 118, WBC 8.0, Hg 10.5, , ANC 5.7, Retic 11K, Vit B12 875, cryoglobulin negative  21 Cr 1.73 WBC 7.9 RBC 3.07 Hg 10.2  RDW 12.6  ANC 5.4 Folate >20 B12 1354 Retic abs 110k Alb 4.4 TP 6.7 Ca 9.6 AlkPhos 99 AST 19 ALT 23  21 Cr 1.77, , Ferritin 172, Retic 3.1, WBC 8.1, hgb 10.4, , RDW 12.4  22 WBC 8.8, Hgb 10.3, , RDW 12.5, , ANC  6.5, , Retic 2.7%  2022 wbc 8.9 hgb 10.5, plt 186,  , B12 1197, folate >20, , Ferritin 120, Retic 3.2  2023:  WBC count 9.3, hemoglobin 11, , platelet count 192, ANC 6.8, creatinine 1.86, vitamin B12 1271, folic acid more than 20,  2023: wbc 7.1, hgb 9.4, , plt 160, ANC 5.2, cr 1.56, alb 4.0, ca 9.6, LFTs WNL, folic acid >20, B12 1034, iron 117, ironsat 44%, ferritin 185, retic 4.3  3/7/24 : wbc 8.8, hgb 9.5, , plt 173, ANC 7.2, cr 1.67, alb 4.1, ca 9.3, LFTs WNL, folic acid >20, B12 1609, tsh 1.160  24: wbc 6.9, hgb 9.9, , plt 171, cr 1.80, tsh 1.18, vit b12 1880, folate >20      Imagin/3/2014: Colonoscopy (Dr. Garcia) -polyps at 60 cm and rectum (3mm). Pathology: tubular adenoma  3/2018: Carotid US- 50% blockage---> started on ASA    2022 MRI prostate:  PI-RADS 5, very high, high volume right-sided transitional zone disease.  Given this lesion size, routine in office TIS biopsies recommended.      Pathology   23 Peripheral smear: WBC population adequate w/ normal differential. No circulating blasts identified. Moderate macrocytic/ normocytic anemia. Platelet population adequate.   2023 PROSTATE, RIGHT MID, TRANSRECTAL BIOPSY:- PROSTATIC ADENOCARCINOMA, SCORE 3+4 (TONO'S GRADE 7, GRADE GROUP 2).- THE TUMOR IS PRESENT DISCONTINUOUSLY BUT  OVERALL MEASURES 6 MM IN LENGTH.    Bone Marrow Biopsy  11/2/2017: Mildly hypercellular marrow (30-40%) with erythroid hyperplasia and dyserythropoiesis. Normal flow and normal cytogenetics. Increased Iron stores. 12/08/23    PMHx: macrocytic anemia, DM, HTN, CKD, B12 and folate deficiency, CAD s/p PCI  SocialHx: Past ETOH use, former smoker, no illicit drug use, benzene and petroleum products, hydrocarbon  FamilyHx: Father- cancer (unknown origin); Mother- heart failure  Allergies: NKDA        Review of Systems  CONSTITUTIONAL: no fevers, no chills, no weight loss, no fatigue, no weakness  HEMATOLOGIC: no abnormal bleeding, no abnormal bruising, no drenching night sweats  ONCOLOGIC: no new masses or lumps  HEENT: no vision loss, no tinnitus or hearing loss, no nose bleeding, no dysphagia, no odynophagia  CVS: no chest pain, no palpitations, no dyspnea on exertion  RESP: no shortness of breath, no hemoptysis, no cough  GI: no nausea, no vomiting, no diarrhea, no constipation, no melena, no hematochezia, no hematemesis, no abdominal pain, no increase in abdominal girth  : no dysuria, no hematuria, no hesitancy, no scrotal swelling, no discharge  INTEGUMENT: no rashes, no abnormal bruising, no nail pitting, no hyperpigmentation  NEURO: no falls, no memory loss, no paresthesias or dysesthesias, no incontinence or retention, no loss of strength to extremities  MSK: no back pain, no new joint pain, no joint swelling  PSYCH: no suicidal or homicidal ideation, no depression, no insomnia, no anhedonia  ENDOCRINE: no heat or cold intolerance, no polyuria, no polydipsia      Objective:      Physical Exam  Vitals:    06/19/24 1424   BP: 130/65   Pulse: (!) 59   Resp: 18   Temp: 98.2 °F (36.8 °C)        GA: AAOx3, NAD, pleasant  male  HEENT: NCAT, PERRLA, EOMI, no oral ulcers  LYMPH: no cervical, axillary or supraclavicular adenopathy  CVS: s1s2 RRR, murmur, no R/G  RESP: CTA b/l, no crackles, no wheezes or  rhonchi  ABD: soft, NT, ND, BS+, no HSM  EXT: no deformities, no pedal edema  SKIN: no rashes, no bruises or purpura, warm and dry  NEURO: normal mentation, strength 5/5 on all 4 extremities, no sensory deficits    Assessment:     # Prostate cancer, stage indeterminate   noted MRI prostate in in November 2023 concerning for prostate cancer   Patient reports undergoing biopsies with Dr. 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 His anemia is not due to renal disease as his EPO level has been high or high normal in the past. 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 Technically with dysplasia and cytopenia diagnosis could be made for MDS No clonality shown with peripheral blood NGS  At this time, we will hold off bone marrow biopsy and will monitor closely. If any worsening of his counts, then will proceed with repeat BM biopsy. His MCV has been slowly trending up and Hg slowly trending down, counts stable from prior on most recent blood work. Will continue to monitor  Noted worsening MCV and Hgb since last visit. Normal B12 and folic acid        # Anemia due to chronic kidney disease N18.9   Chronic kidney disease does not cause macrocytosis. It is unlikely that his renal disease is the cause of his anemia as his EPO level is high  Repeat EPO level, testosterone, TSH, FT4, copper, LDH, SPEP, SFLC, ESR, CRP all normal  Can continue B12 and Folic acid supplements for now though his levels have been consistently normal  Intelligen Myeloid panel results negative, cryoglobulin negative            Plan:   Plan   RTC in 3 months with CBC,   CMP, retic, B12, folate   Continue follow-up with Dr. Cazares, Dr. Do as scheduled.         A total of  30 minutes were spent in review of records, interpretation of test, coordination of care, discussion and counseling with the patient.

## 2024-09-16 NOTE — PROGRESS NOTES
Subjective:       Patient ID: Reg Connell Jr. is a 73 y.o. male.    Chief Complaint: Results        History of Present Illness  Referring: Dr. Garcia (now retired)  PCP: Dr. Jacobs  Vascular: Dr. Rivas  Nephrologist: Dr. Cazares  GI: Dr. Jewell    Problem List: Macrocytic Anemia     Current Treatment: B12 and Folic acid supplements (initiated 2016)    HPI/Clinical History: 71 year old male with mild persistent anemia. Dr. Garcia did a rather complete work up that revealed just a mild macrocytic anemia.   Differential includes b 12 or folate deficiency or very early bone marrow problem.  Bone Marrow Biopsy 11/2/2017- Mildly hypercellular marrow (30-40%) with erythroid hyperplasia and dyserythropoiesis. Normal flow and normal cytogenetics. Increased Iron stores.    Interval History   Today , 09/10/24, patient denies any acute concerns today.  He continues follow-up for prostate cancer with Dr. Do q 6 months. He feels well to today other than intermittent fatigue. He remains active and denies weakness, dizziness, or sob.  He denies any blood in his urine, blood in his stools, symptoms of fatigue, unintentional weight loss, decreased appetite, fevers or chills. He has regular follow-up with nephrology who recently started him on Retacrit 40k units/ml every 28 days.    Labs:  6/13/2017: WBC 9, Hgb 11.5, Plt 305K, , Hgb electrophoresis, Iron 136, ferritin 376, erythropoietin 23, retic ct 3, haptoglobin 195, SPEP: normal  10/17/17: WBC 7.9, Hgb 10.8, Plt 192K, MCV 99, B12 1403, Folate >20.0, TSH WNL, Creatinine 1.78, GFR 38  11/29/17 Testosterone level 463, Erythropoietin 23.3. WBC 8.0 HGB 12.1, MCV 99, PLT 289K  5/25/17 WBC 8.1 HGB 11.7, HCT 35.3, PLT 211K Cr 1.34  11/30/18 WBC 9.1 HGB 11.4, HCT 34.9, , Folate >20  5/31/19 WBC 8.7, Hgb 11.2, Hct 33.3, MCV 99, MCH 33, Plt 202K, Cr 1.55  2/14/20 WBC 7.6, Hgb 10.7, MCV 99, , Cr 1.60 AlkPhos 124, M spike not observed, QUIRINO normal, SLFC ratio  1.15, kappa 32, lambda 28, T4 1.31, testosterone 403, TSH 1.13, EPO 18, , ESR 24, copper 114, ferritin 288, retic 2.9%, CRP 4. Myeloid panel for MDS negative  20 WBC 8.5 Hg 10.9  Cr 2.18 Retic 75k  20 CR 1.80, TP 6.8, ALB 4.3, alk phos 118, WBC 8.0, Hg 10.5, , ANC 5.7, Retic 11K, Vit B12 875, cryoglobulin negative  21 Cr 1.73 WBC 7.9 RBC 3.07 Hg 10.2  RDW 12.6  ANC 5.4 Folate >20 B12 1354 Retic abs 110k Alb 4.4 TP 6.7 Ca 9.6 AlkPhos 99 AST 19 ALT 23  21 Cr 1.77, , Ferritin 172, Retic 3.1, WBC 8.1, hgb 10.4, , RDW 12.4  22 WBC 8.8, Hgb 10.3, , RDW 12.5, , ANC  6.5, , Retic 2.7%  2022 wbc 8.9 hgb 10.5, plt 186,  , B12 1197, folate >20, , Ferritin 120, Retic 3.2  2023:  WBC count 9.3, hemoglobin 11, , platelet count 192, ANC 6.8, creatinine 1.86, vitamin B12 1271, folic acid more than 20,  2023: wbc 7.1, hgb 9.4, , plt 160, ANC 5.2, cr 1.56, alb 4.0, ca 9.6, LFTs WNL, folic acid >20, B12 1034, iron 117, ironsat 44%, ferritin 185, retic 4.3  3/7/24 : wbc 8.8, hgb 9.5, , plt 173, ANC 7.2, cr 1.67, alb 4.1, ca 9.3, LFTs WNL, folic acid >20, B12 1609, tsh 1.160  24: wbc 6.9, hgb 9.9, , plt 171, cr 1.80, tsh 1.18, vit b12 1880, folate >20  09/10/24: wbc 7.9, hgb 10.4, , plt 195, cr 1.87, vit b12 >2000, folate >20    Imagin/3/2014: Colonoscopy (Dr. Garcia) -polyps at 60 cm and rectum (3mm). Pathology: tubular adenoma  3/2018: Carotid US- 50% blockage---> started on ASA    2022 MRI prostate:  PI-RADS 5, very high, high volume right-sided transitional zone disease.  Given this lesion size, routine in office TIS biopsies recommended.      Pathology   23 Peripheral smear: WBC population adequate w/ normal differential. No circulating blasts identified. Moderate macrocytic/ normocytic anemia. Platelet population adequate.   2023 PROSTATE,  RIGHT MID, TRANSRECTAL BIOPSY:- PROSTATIC ADENOCARCINOMA, SCORE 3+4 (TONO'S GRADE 7, GRADE GROUP 2).- THE TUMOR IS PRESENT DISCONTINUOUSLY BUT OVERALL MEASURES 6 MM IN LENGTH.    Bone Marrow Biopsy  11/2/2017: Mildly hypercellular marrow (30-40%) with erythroid hyperplasia and dyserythropoiesis. Normal flow and normal cytogenetics. Increased Iron stores. 12/08/23    PMHx: macrocytic anemia, DM, HTN, CKD, B12 and folate deficiency, CAD s/p PCI  SocialHx: Past ETOH use, former smoker, no illicit drug use, benzene and petroleum products, hydrocarbon  FamilyHx: Father- cancer (unknown origin); Mother- heart failure  Allergies: NKDA        Review of Systems  CONSTITUTIONAL: no fevers, no chills, no weight loss, no fatigue, no weakness  HEMATOLOGIC: no abnormal bleeding, no abnormal bruising, no drenching night sweats  ONCOLOGIC: no new masses or lumps  HEENT: no vision loss, no tinnitus or hearing loss, no nose bleeding, no dysphagia, no odynophagia  CVS: no chest pain, no palpitations, no dyspnea on exertion  RESP: no shortness of breath, no hemoptysis, no cough  GI: no nausea, no vomiting, no diarrhea, no constipation, no melena, no hematochezia, no hematemesis, no abdominal pain, no increase in abdominal girth  : no dysuria, no hematuria, no hesitancy, no scrotal swelling, no discharge  INTEGUMENT: no rashes, no abnormal bruising, no nail pitting, no hyperpigmentation  NEURO: no falls, no memory loss, no paresthesias or dysesthesias, no incontinence or retention, no loss of strength to extremities  MSK: no back pain, no new joint pain, no joint swelling  PSYCH: no suicidal or homicidal ideation, no depression, no insomnia, no anhedonia  ENDOCRINE: no heat or cold intolerance, no polyuria, no polydipsia      Objective:      Physical Exam  Vitals:    09/17/24 1357   BP: (!) 146/64   Pulse: 62   Resp: 18   Temp: 97.9 °F (36.6 °C)          GA: AAOx3, NAD, pleasant  male  HEENT: NCAT, PERRLA, EOMI, no oral  ulcers  LYMPH: no cervical, axillary or supraclavicular adenopathy  CVS: s1s2 RRR, murmur, no R/G  RESP: CTA b/l, no crackles, no wheezes or rhonchi  ABD: soft, NT, ND, BS+, no HSM  EXT: no deformities, no pedal edema  SKIN: no rashes, no bruises or purpura, warm and dry  NEURO: normal mentation, strength 5/5 on all 4 extremities, no sensory deficits    Assessment:     # Prostate cancer, stage indeterminate   noted MRI prostate in in November 2023 concerning for prostate cancer   Patient reports undergoing biopsies with Dr. 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 His anemia is not due to renal disease as his EPO level has been high or high normal in the past. 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 Technically with dysplasia and cytopenia diagnosis could be made for MDS No clonality shown with peripheral blood NGS  At this time, we will hold off bone marrow biopsy and will monitor closely. If any worsening of his counts, then will proceed with repeat BM biopsy. His MCV has been slowly trending up and Hg slowly trending down, counts stable from prior on most recent blood work. Will continue to monitor  Noted worsening MCV and Hgb since last visit. Normal B12 and folic acid        # Anemia due to chronic kidney disease N18.9   Chronic kidney disease does not cause macrocytosis. It is unlikely that his renal disease is the cause of his anemia as his EPO level is high  Repeat EPO level, testosterone, TSH, FT4, copper, LDH, SPEP, SFLC, ESR, CRP all normal  Can continue B12 and Folic acid supplements for now though his levels have  been consistently normal  Intelligen Myeloid panel results negative, cryoglobulin negative            Plan:   Plan   RTC in 3 months with CBC,  CMP, retic, B12, folate   Continue follow-up with Dr. Cazares, Dr. Do as scheduled.     Lucero whitehead FNP-C      A total of  30 minutes were spent in review of records, interpretation of test, coordination of care, discussion and counseling with the patient.

## 2024-09-17 ENCOUNTER — OFFICE VISIT (OUTPATIENT)
Dept: HEMATOLOGY/ONCOLOGY | Facility: CLINIC | Age: 74
End: 2024-09-17
Payer: MEDICARE

## 2024-09-17 VITALS
TEMPERATURE: 98 F | HEIGHT: 66 IN | WEIGHT: 209.5 LBS | BODY MASS INDEX: 33.67 KG/M2 | RESPIRATION RATE: 18 BRPM | DIASTOLIC BLOOD PRESSURE: 64 MMHG | HEART RATE: 62 BPM | OXYGEN SATURATION: 98 % | SYSTOLIC BLOOD PRESSURE: 146 MMHG

## 2024-09-17 DIAGNOSIS — D53.9 MACROCYTIC ANEMIA: Primary | ICD-10-CM

## 2024-09-17 DIAGNOSIS — C61 PROSTATE CANCER: ICD-10-CM

## 2024-09-17 PROCEDURE — 99214 OFFICE O/P EST MOD 30 MIN: CPT | Mod: ,,,

## 2024-09-17 RX ORDER — DAPAGLIFLOZIN 10 MG/1
10 TABLET, FILM COATED ORAL EVERY MORNING
COMMUNITY

## 2024-10-16 ENCOUNTER — PATIENT MESSAGE (OUTPATIENT)
Dept: RESEARCH | Facility: HOSPITAL | Age: 74
End: 2024-10-16
Payer: MEDICARE

## 2024-12-17 ENCOUNTER — OFFICE VISIT (OUTPATIENT)
Dept: HEMATOLOGY/ONCOLOGY | Facility: CLINIC | Age: 74
End: 2024-12-17
Payer: MEDICARE

## 2024-12-17 VITALS
HEIGHT: 66 IN | RESPIRATION RATE: 18 BRPM | HEART RATE: 54 BPM | SYSTOLIC BLOOD PRESSURE: 167 MMHG | OXYGEN SATURATION: 96 % | BODY MASS INDEX: 33.45 KG/M2 | WEIGHT: 208.13 LBS | TEMPERATURE: 96 F | DIASTOLIC BLOOD PRESSURE: 70 MMHG

## 2024-12-17 DIAGNOSIS — D53.9 MACROCYTIC ANEMIA: Primary | ICD-10-CM

## 2024-12-17 DIAGNOSIS — C61 PROSTATE CANCER: ICD-10-CM

## 2024-12-17 PROCEDURE — 99214 OFFICE O/P EST MOD 30 MIN: CPT | Mod: ,,,

## 2024-12-17 NOTE — PROGRESS NOTES
Subjective:       Patient ID: Reg Connell Jr. is a 74 y.o. male.    Chief Complaint: No chief complaint on file.        History of Present Illness  Referring: Dr. Garcia (now retired)  PCP: Dr. Jacobs  Vascular: Dr. Rivas  Nephrologist: Dr. Cazares  GI: Dr. Jewell    Problem List: Macrocytic Anemia     Current Treatment: B12 and Folic acid supplements (initiated 2016)    HPI/Clinical History: 71 year old male with mild persistent anemia. Dr. Garcia did a rather complete work up that revealed just a mild macrocytic anemia.   Differential includes b 12 or folate deficiency or very early bone marrow problem.  Bone Marrow Biopsy 11/2/2017- Mildly hypercellular marrow (30-40%) with erythroid hyperplasia and dyserythropoiesis. Normal flow and normal cytogenetics. Increased Iron stores.    Interval History   Today 12/17/24, patient denies any acute concerns today.  He continues follow-up for prostate cancer with Dr. Do q 6 months. He feels well to today other than intermittent fatigue. He remains active and denies weakness, dizziness, or sob.  He denies any blood in his urine, blood in his stools, symptoms of fatigue, unintentional weight loss, decreased appetite, fevers or chills. He has regular follow-up with nephrology who recently started him on Retacrit 40k units/ml every 28 days.    Labs:  6/13/2017: WBC 9, Hgb 11.5, Plt 305K, , Hgb electrophoresis, Iron 136, ferritin 376, erythropoietin 23, retic ct 3, haptoglobin 195, SPEP: normal  10/17/17: WBC 7.9, Hgb 10.8, Plt 192K, MCV 99, B12 1403, Folate >20.0, TSH WNL, Creatinine 1.78, GFR 38  11/29/17 Testosterone level 463, Erythropoietin 23.3. WBC 8.0 HGB 12.1, MCV 99, PLT 289K  5/25/17 WBC 8.1 HGB 11.7, HCT 35.3, PLT 211K Cr 1.34  11/30/18 WBC 9.1 HGB 11.4, HCT 34.9, , Folate >20  5/31/19 WBC 8.7, Hgb 11.2, Hct 33.3, MCV 99, MCH 33, Plt 202K, Cr 1.55  2/14/20 WBC 7.6, Hgb 10.7, MCV 99, , Cr 1.60 AlkPhos 124, M spike not observed, QUIRINO  normal, SLFC ratio 1.15, kappa 32, lambda 28, T4 1.31, testosterone 403, TSH 1.13, EPO 18, , ESR 24, copper 114, ferritin 288, retic 2.9%, CRP 4. Myeloid panel for MDS negative  20 WBC 8.5 Hg 10.9  Cr 2.18 Retic 75k  20 CR 1.80, TP 6.8, ALB 4.3, alk phos 118, WBC 8.0, Hg 10.5, , ANC 5.7, Retic 11K, Vit B12 875, cryoglobulin negative  21 Cr 1.73 WBC 7.9 RBC 3.07 Hg 10.2  RDW 12.6  ANC 5.4 Folate >20 B12 1354 Retic abs 110k Alb 4.4 TP 6.7 Ca 9.6 AlkPhos 99 AST 19 ALT 23  21 Cr 1.77, , Ferritin 172, Retic 3.1, WBC 8.1, hgb 10.4, , RDW 12.4  22 WBC 8.8, Hgb 10.3, , RDW 12.5, , ANC  6.5, , Retic 2.7%  2022 wbc 8.9 hgb 10.5, plt 186,  , B12 1197, folate >20, , Ferritin 120, Retic 3.2  2023:  WBC count 9.3, hemoglobin 11, , platelet count 192, ANC 6.8, creatinine 1.86, vitamin B12 1271, folic acid more than 20,  2023: wbc 7.1, hgb 9.4, , plt 160, ANC 5.2, cr 1.56, alb 4.0, ca 9.6, LFTs WNL, folic acid >20, B12 1034, iron 117, ironsat 44%, ferritin 185, retic 4.3  3/7/24 : wbc 8.8, hgb 9.5, , plt 173, ANC 7.2, cr 1.67, alb 4.1, ca 9.3, LFTs WNL, folic acid >20, B12 1609, tsh 1.160  24: wbc 6.9, hgb 9.9, , plt 171, cr 1.80, tsh 1.18, vit b12 1880, folate >20  09/10/24: wbc 7.9, hgb 10.4, , plt 195, cr 1.87, vit b12 >2000, folate >20  12/10/24: wbc 9.1, hgb 10.2, , plt 224, ANC 6.9, cr 1.79, alb 4.0, ca 9.0, LFTs WNL, iron 68, ironsat 27%, ferritin 65,  B12 1575,     Imagin/3/2014: Colonoscopy (Dr. Garcia) -polyps at 60 cm and rectum (3mm). Pathology: tubular adenoma  3/2018: Carotid US- 50% blockage---> started on ASA    2022 MRI prostate:  PI-RADS 5, very high, high volume right-sided transitional zone disease.  Given this lesion size, routine in office TIS biopsies recommended.      Pathology   23 Peripheral smear: WBC population  adequate w/ normal differential. No circulating blasts identified. Moderate macrocytic/ normocytic anemia. Platelet population adequate.   04/2023 PROSTATE, RIGHT MID, TRANSRECTAL BIOPSY:- PROSTATIC ADENOCARCINOMA, SCORE 3+4 (TONO'S GRADE 7, GRADE GROUP 2).- THE TUMOR IS PRESENT DISCONTINUOUSLY BUT OVERALL MEASURES 6 MM IN LENGTH.    Bone Marrow Biopsy  11/2/2017: Mildly hypercellular marrow (30-40%) with erythroid hyperplasia and dyserythropoiesis. Normal flow and normal cytogenetics. Increased Iron stores. 12/08/23    PMHx: macrocytic anemia, DM, HTN, CKD, B12 and folate deficiency, CAD s/p PCI  SocialHx: Past ETOH use, former smoker, no illicit drug use, benzene and petroleum products, hydrocarbon  FamilyHx: Father- cancer (unknown origin); Mother- heart failure  Allergies: NKDA        Review of Systems  CONSTITUTIONAL: no fevers, no chills, no weight loss, no fatigue, no weakness  HEMATOLOGIC: no abnormal bleeding, no abnormal bruising, no drenching night sweats  ONCOLOGIC: no new masses or lumps  HEENT: no vision loss, no tinnitus or hearing loss, no nose bleeding, no dysphagia, no odynophagia  CVS: no chest pain, no palpitations, no dyspnea on exertion  RESP: no shortness of breath, no hemoptysis, no cough  GI: no nausea, no vomiting, no diarrhea, no constipation, no melena, no hematochezia, no hematemesis, no abdominal pain, no increase in abdominal girth  : no dysuria, no hematuria, no hesitancy, no scrotal swelling, no discharge  INTEGUMENT: no rashes, no abnormal bruising, no nail pitting, no hyperpigmentation  NEURO: no falls, no memory loss, no paresthesias or dysesthesias, no incontinence or retention, no loss of strength to extremities  MSK: no back pain, no new joint pain, no joint swelling  PSYCH: no suicidal or homicidal ideation, no depression, no insomnia, no anhedonia  ENDOCRINE: no heat or cold intolerance, no polyuria, no polydipsia      Objective:      Physical Exam  Blood pressure (!)  "167/70, pulse (!) 54, temperature 96 °F (35.6 °C), temperature source Oral, resp. rate 18, height 5' 6" (1.676 m), weight 94.4 kg (208 lb 1.6 oz), SpO2 96%.       GA: AAOx3, NAD, pleasant  male  HEENT: NCAT, PERRLA, EOMI, no oral ulcers  LYMPH: no cervical, axillary or supraclavicular adenopathy  CVS: s1s2 RRR, murmur, no R/G  RESP: CTA b/l, no crackles, no wheezes or rhonchi  ABD: soft, NT, ND, BS+, no HSM  EXT: no deformities, no pedal edema  SKIN: no rashes, no bruises or purpura, warm and dry  NEURO: normal mentation, strength 5/5 on all 4 extremities, no sensory deficits    Assessment:     # Prostate cancer, stage indeterminate   noted MRI prostate in in November 2023 concerning for prostate cancer   Patient reports undergoing biopsies with Dr. 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 His anemia is not due to renal disease as his EPO level has been high or high normal in the past. 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 Technically with dysplasia and cytopenia diagnosis could be made for MDS No clonality shown with peripheral blood NGS  At this time, we will hold off bone marrow biopsy and will monitor closely. If any worsening of his counts, then will proceed with repeat BM biopsy. His MCV has been slowly trending up and Hg slowly trending down, counts stable from prior on most recent blood work. Will continue to monitor  Noted worsening MCV and Hgb since last visit. Normal B12 and folic acid        # Anemia due to chronic kidney disease N18.9   Chronic kidney disease does not cause macrocytosis. It is unlikely " that his renal disease is the cause of his anemia as his EPO level is high  Repeat EPO level, testosterone, TSH, FT4, copper, LDH, SPEP, SFLC, ESR, CRP all normal  Can continue B12 and Folic acid supplements for now though his levels have been consistently normal  Intelligen Myeloid panel results negative, cryoglobulin negative            Plan:     RTC in 4 months with CBC, CMP, retic, B12, folate   Continue follow-up with Dr. Cazares, Dr. Do as scheduled.           A total of  30 minutes were spent in review of records, interpretation of test, coordination of care, discussion and counseling with the patient.

## 2025-04-28 ENCOUNTER — OFFICE VISIT (OUTPATIENT)
Dept: HEMATOLOGY/ONCOLOGY | Facility: CLINIC | Age: 75
End: 2025-04-28
Payer: MEDICARE

## 2025-04-28 VITALS
DIASTOLIC BLOOD PRESSURE: 68 MMHG | RESPIRATION RATE: 14 BRPM | SYSTOLIC BLOOD PRESSURE: 149 MMHG | WEIGHT: 211 LBS | OXYGEN SATURATION: 97 % | HEIGHT: 61 IN | BODY MASS INDEX: 39.84 KG/M2 | HEART RATE: 59 BPM | TEMPERATURE: 98 F

## 2025-04-28 DIAGNOSIS — C61 PROSTATE CANCER: ICD-10-CM

## 2025-04-28 DIAGNOSIS — D53.9 MACROCYTIC ANEMIA: Primary | ICD-10-CM

## 2025-04-28 DIAGNOSIS — D63.1 ANEMIA OF CHRONIC RENAL FAILURE, UNSPECIFIED CKD STAGE: ICD-10-CM

## 2025-04-28 DIAGNOSIS — N18.9 ANEMIA OF CHRONIC RENAL FAILURE, UNSPECIFIED CKD STAGE: ICD-10-CM

## 2025-04-28 PROCEDURE — 99214 OFFICE O/P EST MOD 30 MIN: CPT | Mod: ,,,

## 2025-04-28 RX ORDER — LANOLIN ALCOHOL/MO/W.PET/CERES
1000 CREAM (GRAM) TOPICAL EVERY OTHER DAY
COMMUNITY

## 2025-04-28 NOTE — PROGRESS NOTES
Subjective:       Patient ID: Reg Connell Jr. is a 74 y.o. male.    Chief Complaint: Results        History of Present Illness  Referring: Dr. Garcia (now retired)  PCP: Dr. Jacobs  Vascular: Dr. Rivas  Nephrologist: Dr. Cazares  GI: Dr. Jewell    Problem List: Macrocytic Anemia     Current Treatment: B12 and Folic acid supplements (initiated 2016)    HPI/Clinical History: 71 year old male with mild persistent anemia. Dr. Garcia did a rather complete work up that revealed just a mild macrocytic anemia.   Differential includes b 12 or folate deficiency or very early bone marrow problem.  Bone Marrow Biopsy 11/2/2017- Mildly hypercellular marrow (30-40%) with erythroid hyperplasia and dyserythropoiesis. Normal flow and normal cytogenetics. Increased Iron stores.    Interval History   Today 4/28/25, patient denies any acute concerns today.  He continues follow-up for prostate cancer with Dr. Do q 6 months. He feels well to today. He remains active and denies weakness, dizziness, or sob.  He denies any blood in his urine, blood in his stools, symptoms of fatigue, unintentional weight loss, decreased appetite, fevers or chills. He has regular follow-up with nephrology and continues with Retacrit 40k units/ml every 28 days.    Labs:  6/13/2017: WBC 9, Hgb 11.5, Plt 305K, , Hgb electrophoresis, Iron 136, ferritin 376, erythropoietin 23, retic ct 3, haptoglobin 195, SPEP: normal  10/17/17: WBC 7.9, Hgb 10.8, Plt 192K, MCV 99, B12 1403, Folate >20.0, TSH WNL, Creatinine 1.78, GFR 38  11/29/17 Testosterone level 463, Erythropoietin 23.3. WBC 8.0 HGB 12.1, MCV 99, PLT 289K  5/25/17 WBC 8.1 HGB 11.7, HCT 35.3, PLT 211K Cr 1.34  11/30/18 WBC 9.1 HGB 11.4, HCT 34.9, , Folate >20  5/31/19 WBC 8.7, Hgb 11.2, Hct 33.3, MCV 99, MCH 33, Plt 202K, Cr 1.55  2/14/20 WBC 7.6, Hgb 10.7, MCV 99, , Cr 1.60 AlkPhos 124, M spike not observed, QUIRINO normal, SLFC ratio 1.15, kappa 32, lambda 28, T4 1.31,  testosterone 403, TSH 1.13, EPO 18, , ESR 24, copper 114, ferritin 288, retic 2.9%, CRP 4. Myeloid panel for MDS negative  20 WBC 8.5 Hg 10.9  Cr 2.18 Retic 75k  20 CR 1.80, TP 6.8, ALB 4.3, alk phos 118, WBC 8.0, Hg 10.5, , ANC 5.7, Retic 11K, Vit B12 875, cryoglobulin negative  21 Cr 1.73 WBC 7.9 RBC 3.07 Hg 10.2  RDW 12.6  ANC 5.4 Folate >20 B12 1354 Retic abs 110k Alb 4.4 TP 6.7 Ca 9.6 AlkPhos 99 AST 19 ALT 23  21 Cr 1.77, , Ferritin 172, Retic 3.1, WBC 8.1, hgb 10.4, , RDW 12.4  22 WBC 8.8, Hgb 10.3, , RDW 12.5, , ANC  6.5, , Retic 2.7%  2022 wbc 8.9 hgb 10.5, plt 186,  , B12 1197, folate >20, , Ferritin 120, Retic 3.2  2023:  WBC count 9.3, hemoglobin 11, , platelet count 192, ANC 6.8, creatinine 1.86, vitamin B12 1271, folic acid more than 20,  2023: wbc 7.1, hgb 9.4, , plt 160, ANC 5.2, cr 1.56, alb 4.0, ca 9.6, LFTs WNL, folic acid >20, B12 1034, iron 117, ironsat 44%, ferritin 185, retic 4.3  3/7/24 : wbc 8.8, hgb 9.5, , plt 173, ANC 7.2, cr 1.67, alb 4.1, ca 9.3, LFTs WNL, folic acid >20, B12 1609, tsh 1.160  24: wbc 6.9, hgb 9.9, , plt 171, cr 1.80, tsh 1.18, vit b12 1880, folate >20  09/10/24: wbc 7.9, hgb 10.4, , plt 195, cr 1.87, vit b12 >2000, folate >20  12/10/24: wbc 9.1, hgb 10.2, , plt 224, ANC 6.9, cr 1.79, alb 4.0, ca 9.0, LFTs WNL, iron 68, ironsat 27%, ferritin 65,  B12 1575,   4/10/25: wbc 7.8, hgb 11.5, , plt 185, ANC 5.8, cr 1.77, alb 4.3, ca 9.3, LFTs WNL, Alk phos 133,  B12 1640, retic 2.8  Imagin/3/2014: Colonoscopy (Dr. Garcia) -polyps at 60 cm and rectum (3mm). Pathology: tubular adenoma  3/2018: Carotid US- 50% blockage---> started on ASA    2022 MRI prostate:  PI-RADS 5, very high, high volume right-sided transitional zone disease.  Given this lesion size, routine in office TIS biopsies  recommended.      Pathology   12/08/23 Peripheral smear: WBC population adequate w/ normal differential. No circulating blasts identified. Moderate macrocytic/ normocytic anemia. Platelet population adequate.   04/2023 PROSTATE, RIGHT MID, TRANSRECTAL BIOPSY:- PROSTATIC ADENOCARCINOMA, SCORE 3+4 (TONO'S GRADE 7, GRADE GROUP 2).- THE TUMOR IS PRESENT DISCONTINUOUSLY BUT OVERALL MEASURES 6 MM IN LENGTH.    Bone Marrow Biopsy  11/2/2017: Mildly hypercellular marrow (30-40%) with erythroid hyperplasia and dyserythropoiesis. Normal flow and normal cytogenetics. Increased Iron stores. 12/08/23    PMHx: macrocytic anemia, DM, HTN, CKD, B12 and folate deficiency, CAD s/p PCI  SocialHx: Past ETOH use, former smoker, no illicit drug use, benzene and petroleum products, hydrocarbon  FamilyHx: Father- cancer (unknown origin); Mother- heart failure  Allergies: NKDA        Review of Systems  CONSTITUTIONAL: no fevers, no chills, no weight loss, no fatigue, no weakness  HEMATOLOGIC: no abnormal bleeding, no abnormal bruising, no drenching night sweats  ONCOLOGIC: no new masses or lumps  HEENT: no vision loss, no tinnitus or hearing loss, no nose bleeding, no dysphagia, no odynophagia  CVS: no chest pain, no palpitations, no dyspnea on exertion  RESP: no shortness of breath, no hemoptysis, no cough  GI: no nausea, no vomiting, no diarrhea, no constipation, no melena, no hematochezia, no hematemesis, no abdominal pain, no increase in abdominal girth  : no dysuria, no hematuria, no hesitancy, no scrotal swelling, no discharge  INTEGUMENT: no rashes, no abnormal bruising, no nail pitting, no hyperpigmentation  NEURO: no falls, no memory loss, no paresthesias or dysesthesias, no incontinence or retention, no loss of strength to extremities  MSK: no back pain, no new joint pain, no joint swelling  PSYCH: no suicidal or homicidal ideation, no depression, no insomnia, no anhedonia  ENDOCRINE: no heat or cold intolerance, no polyuria,  "no polydipsia      Objective:      Physical Exam  Blood pressure (!) 149/68, pulse (!) 59, temperature 97.9 °F (36.6 °C), temperature source Oral, resp. rate 14, height 5' 1" (1.549 m), weight 95.7 kg (211 lb), SpO2 97%.       GA: AAOx3, NAD, pleasant  male  HEENT: NCAT, PERRLA, EOMI, no oral ulcers  LYMPH: no cervical, axillary or supraclavicular adenopathy  CVS: s1s2 RRR, murmur, no R/G  RESP: CTA b/l, no crackles, no wheezes or rhonchi  ABD: soft, NT, ND, BS+, no HSM  EXT: no deformities, no pedal edema  SKIN: no rashes, no bruises or purpura, warm and dry  NEURO: normal mentation, strength 5/5 on all 4 extremities, no sensory deficits    Assessment:     # Prostate cancer, stage indeterminate   noted MRI prostate in in November 2023 concerning for prostate cancer   Patient reports undergoing biopsies with Dr. 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 His anemia is not due to renal disease as his EPO level has been high or high normal in the past. 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 Technically with dysplasia and cytopenia diagnosis could be made for MDS No clonality shown with peripheral blood NGS  At this time, we will hold off bone marrow biopsy and will monitor closely. If any worsening of his counts, then will proceed with repeat BM biopsy. His MCV has been slowly trending up and Hg slowly trending down, counts stable from prior on most recent blood work. Will continue to monitor  Noted worsening MCV and Hgb since last visit. Normal B12 and folic acid        # Anemia due to chronic kidney disease N18.9 "   Chronic kidney disease does not cause macrocytosis. It is unlikely that his renal disease is the cause of his anemia as his EPO level is high  Repeat EPO level, testosterone, TSH, FT4, copper, LDH, SPEP, SFLC, ESR, CRP all normal  Can continue B12 and Folic acid supplements for now though his levels have been consistently normal  Intelligen Myeloid panel results negative, cryoglobulin negative            Plan:   4/28/25  RTC in 4 months with CBC, CMP, retic, B12, folate   Continue follow-up with Dr. Cazares, Dr. Do as scheduled.     Lucero Javed FNP-C

## 2025-09-03 ENCOUNTER — OFFICE VISIT (OUTPATIENT)
Dept: HEMATOLOGY/ONCOLOGY | Facility: CLINIC | Age: 75
End: 2025-09-03
Payer: MEDICARE

## 2025-09-03 VITALS
DIASTOLIC BLOOD PRESSURE: 55 MMHG | HEIGHT: 61 IN | BODY MASS INDEX: 39.11 KG/M2 | HEART RATE: 60 BPM | OXYGEN SATURATION: 96 % | WEIGHT: 207.13 LBS | TEMPERATURE: 98 F | RESPIRATION RATE: 12 BRPM | SYSTOLIC BLOOD PRESSURE: 130 MMHG

## 2025-09-03 DIAGNOSIS — C61 PROSTATE CANCER: Primary | ICD-10-CM

## 2025-09-03 DIAGNOSIS — D53.9 MACROCYTIC ANEMIA: ICD-10-CM

## 2025-09-03 PROCEDURE — 99214 OFFICE O/P EST MOD 30 MIN: CPT | Mod: ,,,
